# Patient Record
Sex: FEMALE | Race: WHITE | Employment: FULL TIME | URBAN - METROPOLITAN AREA
[De-identification: names, ages, dates, MRNs, and addresses within clinical notes are randomized per-mention and may not be internally consistent; named-entity substitution may affect disease eponyms.]

---

## 2021-01-30 ENCOUNTER — HOSPITAL ENCOUNTER (OUTPATIENT)
Dept: LAB | Age: 31
Discharge: HOME | End: 2021-01-30

## 2021-01-30 LAB — XX-LABCORP SPECIMEN COL,LCBCF: NORMAL

## 2021-01-30 PROCEDURE — 99001 SPECIMEN HANDLING PT-LAB: CPT

## 2021-02-11 ENCOUNTER — HOSPITAL ENCOUNTER (OUTPATIENT)
Dept: PREADMISSION TESTING | Age: 31
Discharge: HOME | End: 2021-02-11
Payer: MEDICAID

## 2021-02-11 VITALS — BODY MASS INDEX: 34.73 KG/M2 | WEIGHT: 196 LBS | HEIGHT: 63 IN

## 2021-02-11 PROCEDURE — U0003 INFECTIOUS AGENT DETECTION BY NUCLEIC ACID (DNA OR RNA); SEVERE ACUTE RESPIRATORY SYNDROME CORONAVIRUS 2 (SARS-COV-2) (CORONAVIRUS DISEASE [COVID-19]), AMPLIFIED PROBE TECHNIQUE, MAKING USE OF HIGH THROUGHPUT TECHNOLOGIES AS DESCRIBED BY CMS-2020-01-R: HCPCS

## 2021-02-11 RX ORDER — ALBUTEROL SULFATE 90 UG/1
2 AEROSOL, METERED RESPIRATORY (INHALATION)
COMMUNITY
Start: 2020-06-01

## 2021-02-11 RX ORDER — VITAMIN A ACETATE, BETA CAROTENE, ASCORBIC ACID, CHOLECALCIFEROL, .ALPHA.-TOCOPHEROL ACETATE, DL-, THIAMINE MONONITRATE, RIBOFLAVIN, NIACINAMIDE, PYRIDOXINE HYDROCHLORIDE, FOLIC ACID, CYANOCOBALAMIN, CALCIUM CARBONATE, FERROUS FUMARATE, ZINC OXIDE, CUPRIC OXIDE 3080; 12; 120; 400; 1; 1.84; 3; 20; 22; 920; 25; 200; 27; 10; 2 [IU]/1; UG/1; MG/1; [IU]/1; MG/1; MG/1; MG/1; MG/1; MG/1; [IU]/1; MG/1; MG/1; MG/1; MG/1; MG/1
1 TABLET, FILM COATED ORAL DAILY
COMMUNITY

## 2021-02-11 RX ORDER — SODIUM CHLORIDE, SODIUM LACTATE, POTASSIUM CHLORIDE, CALCIUM CHLORIDE 600; 310; 30; 20 MG/100ML; MG/100ML; MG/100ML; MG/100ML
125 INJECTION, SOLUTION INTRAVENOUS CONTINUOUS
Status: CANCELLED | OUTPATIENT
Start: 2021-02-11

## 2021-02-11 NOTE — PERIOP NOTES
Leave all valuables at home to include wallets/purse, money/credit cards, electronics to include cell phone, laptop, and tablets. Please arrange for your transportation home Denies any prosthetics. Patient states that the family physician is not aware of upcoming procedure. Do not put any lotion, jewelry, makeup, fingernail or toenail polish; no wigs, no private piercings; no tictac,gum and mouthwash. Denies sleep apnea. Denies family history of anesthesia complications. Please be aware that due to unforeseen circumstances, delays may occur and your patience will be appreciated. Denies shortness of breath nor chest pain while climbing stairs. Pt aware: to come in for covid testing  prior to dos; to self quarantine (includes no visitors) after covid test done.  covid 2-11-21

## 2021-02-12 ENCOUNTER — HOSPITAL ENCOUNTER (OUTPATIENT)
Dept: LAB | Age: 31
Discharge: HOME | End: 2021-02-12
Payer: MEDICAID

## 2021-02-12 ENCOUNTER — ANESTHESIA EVENT (OUTPATIENT)
Dept: SURGERY | Age: 31
End: 2021-02-12
Payer: MEDICAID

## 2021-02-12 LAB
BASOPHILS # BLD: 0.1 K/UL (ref 0–0.1)
BASOPHILS NFR BLD: 0 % (ref 0–2)
DIFFERENTIAL METHOD BLD: ABNORMAL
EOSINOPHIL # BLD: 0.5 K/UL (ref 0–0.4)
EOSINOPHIL NFR BLD: 4 % (ref 0–5)
ERYTHROCYTE [DISTWIDTH] IN BLOOD BY AUTOMATED COUNT: 12.2 % (ref 11.6–14.5)
HCT VFR BLD AUTO: 41.5 % (ref 35–45)
HGB BLD-MCNC: 14.3 G/DL (ref 12–16)
LYMPHOCYTES # BLD: 3.1 K/UL (ref 0.9–3.6)
LYMPHOCYTES NFR BLD: 28 % (ref 21–52)
MCH RBC QN AUTO: 31.4 PG (ref 24–34)
MCHC RBC AUTO-ENTMCNC: 34.5 G/DL (ref 31–37)
MCV RBC AUTO: 91.2 FL (ref 74–97)
MONOCYTES # BLD: 0.8 K/UL (ref 0.05–1.2)
MONOCYTES NFR BLD: 7 % (ref 3–10)
NEUTS SEG # BLD: 6.9 K/UL (ref 1.8–8)
NEUTS SEG NFR BLD: 61 % (ref 40–73)
PLATELET # BLD AUTO: 246 K/UL (ref 135–420)
PMV BLD AUTO: 9.5 FL (ref 9.2–11.8)
RBC # BLD AUTO: 4.55 M/UL (ref 4.2–5.3)
SARS-COV-2, COV2NT: NOT DETECTED
WBC # BLD AUTO: 11.3 K/UL (ref 4.6–13.2)

## 2021-02-12 PROCEDURE — 36415 COLL VENOUS BLD VENIPUNCTURE: CPT

## 2021-02-12 PROCEDURE — 85025 COMPLETE CBC W/AUTO DIFF WBC: CPT

## 2021-02-15 ENCOUNTER — HOSPITAL ENCOUNTER (OUTPATIENT)
Age: 31
Setting detail: OUTPATIENT SURGERY
Discharge: HOME | End: 2021-02-15
Attending: OBSTETRICS & GYNECOLOGY | Admitting: OBSTETRICS & GYNECOLOGY
Payer: MEDICAID

## 2021-02-15 ENCOUNTER — ANESTHESIA (OUTPATIENT)
Dept: SURGERY | Age: 31
End: 2021-02-15
Payer: MEDICAID

## 2021-02-15 VITALS
TEMPERATURE: 97.1 F | BODY MASS INDEX: 36.14 KG/M2 | HEIGHT: 63 IN | WEIGHT: 204 LBS | DIASTOLIC BLOOD PRESSURE: 54 MMHG | SYSTOLIC BLOOD PRESSURE: 115 MMHG | HEART RATE: 72 BPM | RESPIRATION RATE: 16 BRPM | OXYGEN SATURATION: 99 %

## 2021-02-15 DIAGNOSIS — G89.18 POSTOPERATIVE PAIN: Primary | ICD-10-CM

## 2021-02-15 PROBLEM — O02.1 MISSED ABORTION: Chronic | Status: ACTIVE | Noted: 2021-02-15

## 2021-02-15 PROBLEM — O02.1 MISSED ABORTION: Chronic | Status: RESOLVED | Noted: 2021-02-15 | Resolved: 2021-02-15

## 2021-02-15 LAB
ABO + RH BLD: NORMAL
BLOOD GROUP ANTIBODIES SERPL: NORMAL
SPECIMEN EXP DATE BLD: NORMAL

## 2021-02-15 PROCEDURE — 36415 COLL VENOUS BLD VENIPUNCTURE: CPT

## 2021-02-15 PROCEDURE — 77030020782 HC GWN BAIR PAWS FLX 3M -B: Performed by: OBSTETRICS & GYNECOLOGY

## 2021-02-15 PROCEDURE — 74011250636 HC RX REV CODE- 250/636: Performed by: OBSTETRICS & GYNECOLOGY

## 2021-02-15 PROCEDURE — 76210000016 HC OR PH I REC 1 TO 1.5 HR: Performed by: OBSTETRICS & GYNECOLOGY

## 2021-02-15 PROCEDURE — 2709999900 HC NON-CHARGEABLE SUPPLY: Performed by: OBSTETRICS & GYNECOLOGY

## 2021-02-15 PROCEDURE — 76210000021 HC REC RM PH II 0.5 TO 1 HR: Performed by: OBSTETRICS & GYNECOLOGY

## 2021-02-15 PROCEDURE — 77030040361 HC SLV COMPR DVT MDII -B: Performed by: OBSTETRICS & GYNECOLOGY

## 2021-02-15 PROCEDURE — 77030008578 HC TBNG UTER SUC BUSS -A: Performed by: OBSTETRICS & GYNECOLOGY

## 2021-02-15 PROCEDURE — 77030011210: Performed by: OBSTETRICS & GYNECOLOGY

## 2021-02-15 PROCEDURE — 74011250636 HC RX REV CODE- 250/636: Performed by: ANESTHESIOLOGY

## 2021-02-15 PROCEDURE — 88305 TISSUE EXAM BY PATHOLOGIST: CPT

## 2021-02-15 PROCEDURE — 74011000250 HC RX REV CODE- 250: Performed by: ANESTHESIOLOGY

## 2021-02-15 PROCEDURE — 86901 BLOOD TYPING SEROLOGIC RH(D): CPT

## 2021-02-15 PROCEDURE — 76010000154 HC OR TIME FIRST 0.5 HR: Performed by: OBSTETRICS & GYNECOLOGY

## 2021-02-15 PROCEDURE — 76060000031 HC ANESTHESIA FIRST 0.5 HR: Performed by: OBSTETRICS & GYNECOLOGY

## 2021-02-15 PROCEDURE — 77030012508 HC MSK AIRWY LMA AMBU -A: Performed by: ANESTHESIOLOGY

## 2021-02-15 RX ORDER — ONDANSETRON 2 MG/ML
INJECTION INTRAMUSCULAR; INTRAVENOUS AS NEEDED
Status: DISCONTINUED | OUTPATIENT
Start: 2021-02-15 | End: 2021-02-15 | Stop reason: HOSPADM

## 2021-02-15 RX ORDER — ONDANSETRON 2 MG/ML
4 INJECTION INTRAMUSCULAR; INTRAVENOUS ONCE
Status: DISCONTINUED | OUTPATIENT
Start: 2021-02-15 | End: 2021-02-15 | Stop reason: HOSPADM

## 2021-02-15 RX ORDER — LIDOCAINE HYDROCHLORIDE 20 MG/ML
INJECTION, SOLUTION EPIDURAL; INFILTRATION; INTRACAUDAL; PERINEURAL AS NEEDED
Status: DISCONTINUED | OUTPATIENT
Start: 2021-02-15 | End: 2021-02-15 | Stop reason: HOSPADM

## 2021-02-15 RX ORDER — MAGNESIUM SULFATE 100 %
4 CRYSTALS MISCELLANEOUS AS NEEDED
Status: DISCONTINUED | OUTPATIENT
Start: 2021-02-15 | End: 2021-02-15 | Stop reason: HOSPADM

## 2021-02-15 RX ORDER — SODIUM CHLORIDE 0.9 % (FLUSH) 0.9 %
5-40 SYRINGE (ML) INJECTION AS NEEDED
Status: DISCONTINUED | OUTPATIENT
Start: 2021-02-15 | End: 2021-02-15 | Stop reason: HOSPADM

## 2021-02-15 RX ORDER — INSULIN LISPRO 100 [IU]/ML
INJECTION, SOLUTION INTRAVENOUS; SUBCUTANEOUS ONCE
Status: DISCONTINUED | OUTPATIENT
Start: 2021-02-15 | End: 2021-02-15 | Stop reason: HOSPADM

## 2021-02-15 RX ORDER — FENTANYL CITRATE 50 UG/ML
25 INJECTION, SOLUTION INTRAMUSCULAR; INTRAVENOUS
Status: DISCONTINUED | OUTPATIENT
Start: 2021-02-15 | End: 2021-02-15 | Stop reason: HOSPADM

## 2021-02-15 RX ORDER — IBUPROFEN 800 MG/1
800 TABLET ORAL
Qty: 30 TAB | Refills: 0 | Status: SHIPPED | OUTPATIENT
Start: 2021-02-15

## 2021-02-15 RX ORDER — KETOROLAC TROMETHAMINE 15 MG/ML
INJECTION, SOLUTION INTRAMUSCULAR; INTRAVENOUS AS NEEDED
Status: DISCONTINUED | OUTPATIENT
Start: 2021-02-15 | End: 2021-02-15 | Stop reason: HOSPADM

## 2021-02-15 RX ORDER — DEXTROSE MONOHYDRATE 100 MG/ML
125-250 INJECTION, SOLUTION INTRAVENOUS AS NEEDED
Status: DISCONTINUED | OUTPATIENT
Start: 2021-02-15 | End: 2021-02-15 | Stop reason: HOSPADM

## 2021-02-15 RX ORDER — HYDROCODONE BITARTRATE AND ACETAMINOPHEN 5; 325 MG/1; MG/1
1 TABLET ORAL AS NEEDED
Status: DISCONTINUED | OUTPATIENT
Start: 2021-02-15 | End: 2021-02-15 | Stop reason: HOSPADM

## 2021-02-15 RX ORDER — SODIUM CHLORIDE, SODIUM LACTATE, POTASSIUM CHLORIDE, CALCIUM CHLORIDE 600; 310; 30; 20 MG/100ML; MG/100ML; MG/100ML; MG/100ML
125 INJECTION, SOLUTION INTRAVENOUS CONTINUOUS
Status: DISCONTINUED | OUTPATIENT
Start: 2021-02-15 | End: 2021-02-15 | Stop reason: HOSPADM

## 2021-02-15 RX ORDER — OXYCODONE AND ACETAMINOPHEN 5; 325 MG/1; MG/1
1 TABLET ORAL
Qty: 20 TAB | Refills: 0 | Status: SHIPPED | OUTPATIENT
Start: 2021-02-15 | End: 2021-02-20

## 2021-02-15 RX ORDER — DIPHENHYDRAMINE HYDROCHLORIDE 50 MG/ML
12.5 INJECTION, SOLUTION INTRAMUSCULAR; INTRAVENOUS
Status: DISCONTINUED | OUTPATIENT
Start: 2021-02-15 | End: 2021-02-15 | Stop reason: HOSPADM

## 2021-02-15 RX ORDER — SODIUM CHLORIDE 0.9 % (FLUSH) 0.9 %
5-40 SYRINGE (ML) INJECTION EVERY 8 HOURS
Status: DISCONTINUED | OUTPATIENT
Start: 2021-02-15 | End: 2021-02-15 | Stop reason: HOSPADM

## 2021-02-15 RX ORDER — ALBUTEROL SULFATE 0.83 MG/ML
2.5 SOLUTION RESPIRATORY (INHALATION) AS NEEDED
Status: DISCONTINUED | OUTPATIENT
Start: 2021-02-15 | End: 2021-02-15 | Stop reason: HOSPADM

## 2021-02-15 RX ORDER — SODIUM CHLORIDE, SODIUM LACTATE, POTASSIUM CHLORIDE, CALCIUM CHLORIDE 600; 310; 30; 20 MG/100ML; MG/100ML; MG/100ML; MG/100ML
150 INJECTION, SOLUTION INTRAVENOUS CONTINUOUS
Status: DISCONTINUED | OUTPATIENT
Start: 2021-02-15 | End: 2021-02-15 | Stop reason: HOSPADM

## 2021-02-15 RX ORDER — NALOXONE HYDROCHLORIDE 0.4 MG/ML
0.1 INJECTION, SOLUTION INTRAMUSCULAR; INTRAVENOUS; SUBCUTANEOUS AS NEEDED
Status: DISCONTINUED | OUTPATIENT
Start: 2021-02-15 | End: 2021-02-15 | Stop reason: HOSPADM

## 2021-02-15 RX ORDER — DEXAMETHASONE SODIUM PHOSPHATE 4 MG/ML
INJECTION, SOLUTION INTRA-ARTICULAR; INTRALESIONAL; INTRAMUSCULAR; INTRAVENOUS; SOFT TISSUE AS NEEDED
Status: DISCONTINUED | OUTPATIENT
Start: 2021-02-15 | End: 2021-02-15 | Stop reason: HOSPADM

## 2021-02-15 RX ORDER — PROPOFOL 10 MG/ML
INJECTION, EMULSION INTRAVENOUS AS NEEDED
Status: DISCONTINUED | OUTPATIENT
Start: 2021-02-15 | End: 2021-02-15 | Stop reason: HOSPADM

## 2021-02-15 RX ORDER — FENTANYL CITRATE 50 UG/ML
INJECTION, SOLUTION INTRAMUSCULAR; INTRAVENOUS AS NEEDED
Status: DISCONTINUED | OUTPATIENT
Start: 2021-02-15 | End: 2021-02-15 | Stop reason: HOSPADM

## 2021-02-15 RX ORDER — HYDROMORPHONE HYDROCHLORIDE 1 MG/ML
0.2 INJECTION, SOLUTION INTRAMUSCULAR; INTRAVENOUS; SUBCUTANEOUS AS NEEDED
Status: DISCONTINUED | OUTPATIENT
Start: 2021-02-15 | End: 2021-02-15 | Stop reason: HOSPADM

## 2021-02-15 RX ADMIN — FENTANYL CITRATE 50 MCG: 50 INJECTION, SOLUTION INTRAMUSCULAR; INTRAVENOUS at 10:38

## 2021-02-15 RX ADMIN — ONDANSETRON HYDROCHLORIDE 4 MG: 2 INJECTION INTRAMUSCULAR; INTRAVENOUS at 10:50

## 2021-02-15 RX ADMIN — FENTANYL CITRATE 25 MCG: 50 INJECTION INTRAMUSCULAR; INTRAVENOUS at 11:30

## 2021-02-15 RX ADMIN — SODIUM CHLORIDE, SODIUM LACTATE, POTASSIUM CHLORIDE, AND CALCIUM CHLORIDE: 600; 310; 30; 20 INJECTION, SOLUTION INTRAVENOUS at 10:32

## 2021-02-15 RX ADMIN — FENTANYL CITRATE 25 MCG: 50 INJECTION INTRAMUSCULAR; INTRAVENOUS at 11:16

## 2021-02-15 RX ADMIN — PROPOFOL 150 MG: 10 INJECTION, EMULSION INTRAVENOUS at 10:35

## 2021-02-15 RX ADMIN — SODIUM CHLORIDE, SODIUM LACTATE, POTASSIUM CHLORIDE, AND CALCIUM CHLORIDE 150 ML/HR: 600; 310; 30; 20 INJECTION, SOLUTION INTRAVENOUS at 11:15

## 2021-02-15 RX ADMIN — KETOROLAC TROMETHAMINE 30 MG: 15 INJECTION, SOLUTION INTRAMUSCULAR; INTRAVENOUS at 10:50

## 2021-02-15 RX ADMIN — FENTANYL CITRATE 50 MCG: 50 INJECTION, SOLUTION INTRAMUSCULAR; INTRAVENOUS at 10:34

## 2021-02-15 RX ADMIN — DEXAMETHASONE SODIUM PHOSPHATE 4 MG: 4 INJECTION, SOLUTION INTRAMUSCULAR; INTRAVENOUS at 10:39

## 2021-02-15 RX ADMIN — SODIUM CHLORIDE, SODIUM LACTATE, POTASSIUM CHLORIDE, AND CALCIUM CHLORIDE 125 ML/HR: 600; 310; 30; 20 INJECTION, SOLUTION INTRAVENOUS at 09:14

## 2021-02-15 RX ADMIN — LIDOCAINE HYDROCHLORIDE 60 MG: 20 INJECTION, SOLUTION EPIDURAL; INFILTRATION; INTRACAUDAL; PERINEURAL at 10:34

## 2021-02-15 NOTE — ANESTHESIA PREPROCEDURE EVALUATION
Relevant Problems   No relevant active problems       Anesthetic History   No history of anesthetic complications            Review of Systems / Medical History  Patient summary reviewed, nursing notes reviewed and pertinent labs reviewed    Pulmonary  Within defined limits                 Neuro/Psych         Psychiatric history     Cardiovascular                  Exercise tolerance: >4 METS     GI/Hepatic/Renal  Within defined limits              Endo/Other  Within defined limits           Other Findings              Physical Exam    Airway  Mallampati: II  TM Distance: 4 - 6 cm  Neck ROM: normal range of motion   Mouth opening: Normal     Cardiovascular  Regular rate and rhythm,  S1 and S2 normal,  no murmur, click, rub, or gallop             Dental  No notable dental hx       Pulmonary  Breath sounds clear to auscultation               Abdominal  GI exam deferred       Other Findings            Anesthetic Plan    ASA: 2  Anesthesia type: general          Induction: Intravenous  Anesthetic plan and risks discussed with: Patient

## 2021-02-15 NOTE — ROUTINE PROCESS
Reviewed PTA medication list with patient/caregiver and patient/caregiver denies any additional medications. Patient admits to having a responsible adult care for them at home for at least 24 hours after surgery. Patient encouraged to use gown warming system and informed that using said warming gown to regulate body temperature prior to a procedure has been shown to help reduce the risks of blood clots and infection. Patient's pharmacy of choice verified and documented in PTA medication section. Dual skin assessment & fall risk band verification completed with LUCRECIA Blancas.

## 2021-02-15 NOTE — INTERVAL H&P NOTE
Update History & Physical 
 
The Patient's History and Physical of February 11, 2021 was reviewed with the patient and I examined the patient. There was no change. The surgical site was confirmed by the patient and me. Plan:  The risk, benefits, expected outcome, and alternative to the recommended procedure have been discussed with the patient. Patient understands and wants to proceed with the procedure.  
 
Electronically signed by Cruz Frazier MD on 2/15/2021 at 10:27 AM

## 2021-02-15 NOTE — PERIOP NOTES
Discharge instructions completed via phone call to [employer] - opportunity for questions - AVS med list reviewed for duplicates

## 2021-02-15 NOTE — DISCHARGE INSTRUCTIONS
[facility], [address], [address] [facility], [address], [address], [place], [address] [facility]  Office: [phone]  Fax:    [phone]    PROCEDURE: Procedure(s):  DILATATION AND CURETTAGE WITH SUCTION    Notify [facility] IMMEDIATELY if any of the following occur:     You are unable to urinate. Urgency to urinate is not uncommon.  Excessive vaginal bleeding > 1 maxi pad an hour for more then 2 hours straight.  Temperature above 101.0° and / or chills.  You are nauseous and / or vomiting and you cannot hold down any fluids.  Your pain is not controlled with the pain medication prescribed. Special Considerations:      Do not drive for at least 24 hours after the procedure and until you are no longer taking narcotic pain medication and you are able to move and react without hesitation.  You may return to work the following day. [x] Pelvic rest (nothing in the vagina) for 3 weeks. [] No heavy lifting over 10 pounds & no strenuous exercise for 6 weeks. [] Other instructions:         MEDICATIONS: PROVIDED AT DISCHARGE OR PREVIOUSLY PRESCRIBED AT PRE OPERATIVE APPOINTMENT WITH [facility] --  Narcotic Pain Med.   []  Vicodin®   [x]  Percocet®   []  Dilaudid®    Non-narcotic Pain Med. [x]   Ibuprofen        Antibiotics   []  Cipro®   []  Keflex®     []  Bactrim DS®       Urgency   []   Vesicare®       Nausea      []    Zofran®     []    Phenergan®       Other   []    Colace          If you have not already scheduled your post operative appointment please do so with our office staff. Your post partum appointment should be in 3 weeks. Please contact [place] [address] [facility] at [phone] or go to the nearest Emergency Department / Urgent Care facility for any other medical questions or concerns.                DISCHARGE SUMMARY from Nurse    PATIENT INSTRUCTIONS:    After general anesthesia or intravenous sedation, for 24 hours or while taking prescription Narcotics:  · Limit your activities  · Do not drive and operate hazardous machinery  · Do not make important personal or business decisions  · Do  not drink alcoholic beverages  · If you have not urinated within 8 hours after discharge, please contact your surgeon on call. Report the following to your surgeon:  · Excessive pain, swelling, redness or odor of or around the surgical area  · Temperature over 100.5  · Nausea and vomiting lasting longer than 4 hours or if unable to take medications  · Any signs of decreased circulation or nerve impairment to extremity: change in color, persistent  numbness, tingling, coldness or increase pain  · Any questions    What to do at Home:  Recommended activity: Ambulate in house, No sex until advised  and No driving while on analgesics,     If you experience any of the following symptoms above, please follow up with Dr. Kayla Rose. *  Please give a list of your current medications to your Primary Care Provider. *  Please update this list whenever your medications are discontinued, doses are      changed, or new medications (including over-the-counter products) are added. *  Please carry medication information at all times in case of emergency situations. These are general instructions for a healthy lifestyle:    No smoking/ No tobacco products/ Avoid exposure to second hand smoke  Surgeon General's Warning:  Quitting smoking now greatly reduces serious risk to your health.     Obesity, smoking, and sedentary lifestyle greatly increases your risk for illness    A healthy diet, regular physical exercise & weight monitoring are important for maintaining a healthy lifestyle    You may be retaining fluid if you have a history of heart failure or if you experience any of the following symptoms:  Weight gain of 3 pounds or more overnight or 5 pounds in a week, increased swelling in our hands or feet or shortness of breath while lying flat in bed.  Please call your doctor as soon as you notice any of these symptoms; do not wait until your next office visit. Patient armband removed and shredded    The discharge information has been reviewed with the patient and caregiver. The patient and caregiver verbalized understanding. Discharge medications reviewed with the patient and caregiver and appropriate educational materials and side effects teaching were provided. Learning About Coronavirus [phone])  Coronavirus [phone]): Overview  What is coronavirus (COVID-19)? The coronavirus disease (COVID-19) is caused by a virus. It is an illness that was first found in [place], [place], in December 2019. It has since spread worldwide. The virus can cause fever, cough, and trouble breathing. In severe cases, it can cause pneumonia and make it hard to breathe without help. It can cause death. Coronaviruses are a large group of viruses. They cause the common cold. They also cause more serious illnesses like Middle East respiratory syndrome (MERS) and severe acute respiratory syndrome (SARS). COVID-19 is caused by a novel coronavirus. That means it's a new type that has not been seen in people before. This virus spreads person-to-person through droplets from coughing and sneezing. It can also spread when you are close to someone who is infected. And it can spread when you touch something that has the virus on it, such as a doorknob or a tabletop. What can you do to protect yourself from coronavirus (COVID-19)? The best way to protect yourself from getting sick is to:  · Avoid areas where there is an outbreak. · Avoid contact with people who may be infected. · Wash your hands often with soap or alcohol-based hand sanitizers. · Avoid crowds and try to stay at least 6 feet away from other people. · Wash your hands often, especially after you cough or sneeze. Use soap and water, and scrub for at least 20 seconds.  If soap and water aren't available, use an alcohol-based hand . · Avoid touching your mouth, nose, and eyes. What can you do to avoid spreading the virus to others? To help avoid spreading the virus to others:  · Cover your mouth with a tissue when you cough or sneeze. Then throw the tissue in the trash. · Use a disinfectant to clean things that you touch often. · Stay home if you are sick or have been exposed to the virus. Don't go to school, work, or public areas. And don't use public transportation. · If you are sick:  ? Leave your home only if you need to get medical care. But call the doctor's office first so they know you're coming. And wear a face mask, if you have one.  ? If you have a face mask, wear it whenever you're around other people. It can help stop the spread of the virus when you cough or sneeze. ? Clean and disinfect your home every day. Use household  and disinfectant wipes or sprays. Take special care to clean things that you grab with your hands. These include doorknobs, remote controls, phones, and handles on your refrigerator and microwave. And don't forget countertops, tabletops, bathrooms, and computer keyboards. When to call for help  Call 911 anytime you think you may need emergency care. For example, call if:  · You have severe trouble breathing. (You can't talk at all.)  · You have constant chest pain or pressure. · You are severely dizzy or lightheaded. · You are confused or can't think clearly. · Your face and lips have a blue color. · You pass out (lose consciousness) or are very hard to wake up. Call your doctor now if you develop symptoms such as:  · Shortness of breath. · Fever. · Cough. If you need to get care, call ahead to the doctor's office for instructions before you go. Make sure you wear a face mask, if you have one, to prevent exposing other people to the virus. Where can you get the latest information? The following health organizations are tracking and studying this virus. Their websites contain the most up-to-date information. [place] also learn what to do if you think you may have been exposed to the virus. · U.S. Centers for Disease Control and Prevention (CDC): The CDC provides updated news about the disease and travel advice. The website also tells you how to prevent the spread of infection. www.cdc.gov  · World Health Organization [facility]): WHO offers information about the virus outbreaks. WHO also has travel advice. www.who.int  Current as of: April 1, 2020               Content Version: 12.4  © 2006-2020 [employer]. Care instructions adapted under license by your healthcare professional. If you have questions about a medical condition or this instruction, always ask your healthcare professional. [address] any warranty or liability for your use of this information.     ___________________________________________________________________________________________________________________________________

## 2021-02-15 NOTE — PROGRESS NOTES
conducted an initial consultation, Spiritual Assessment and  Loss pastoral care encounter for Erik Fritz, who is a 26 y.o. According to the patients EMR Rastafari Affiliation is: Sylvia Torres. Name for baby:     Location of pastoral care encounter was: [x] Day Surgery [] ED []  L&D [] 2A  [] Other:    Type of Loss:  [x] Miscarriage(<19 Weeks)     [] Stillborn (>20 Weeks)     [] Live Birth resulting in Death       [] Ectopic     [] Blighted Ovum     [] Molar     []      [] SIDS    Estimated Gestastional Age was       weeks. Patient is open to more information about Share Services:  [] Yes  [x]  No  Email Address: Mother's Address and Phone Number are:  Zeinab Sanz  [unfilled]    Affiliated with a Firm[employer]   [x] Yes  [] No (If Yes, include name below). The  provided the following Interventions:  Initiated a relationship of care and support. Explored issues of sheila, belief, spirituality and Judaism/ritual needs while hospitalized. Listened empathically. Provided information about Spiritual Care Services, Grief and  Loss Support.  provided assurance of continued prayers on patients behalf. Chart reviewed. Discussed grief issues.   Memorabilia was given to mother [] Yes  [x] No    Sister Luiz Black[place], [address]  [phone]

## 2021-02-15 NOTE — OP NOTES
[facility]  OPERATIVE REPORT    Name:  Aba Neely  MR#:   [id number]  :  1990  ACCOUNT #:  [de-identified]  DATE OF SERVICE:  02/15/2021    PREOPERATIVE DIAGNOSIS:  Missed . POSTOPERATIVE DIAGNOSIS:  Missed . PROCEDURE PERFORMED:  Suction, dilation, and curettage. SURGEON:  Jennifer Rucker MD    ASSISTANT:  Mary Sebastian. ANESTHESIOLOGIST:  Sammy Bejarano MD    ANESTHESIA:  General.    COMPLICATIONS:  None. SPECIMENS REMOVED:  Appropriate amounts of products of conception. IMPLANTS:  None. ESTIMATED BLOOD LOSS:  Minimal.    IV FLUIDS:  2000 mL of lactated Ringers. URINE OUTPUT:  Not recorded. INDICATIONS:  This is a multiparous patient with a missed  diagnosed on ultrasound on , findings were reviewed with the patient. Risks, benefits, and alternatives were discussed, and she opted for surgical management as stated above. All questions were answered prior to surgical start time. PROCEDURE:  The patient was taken to the OR where general anesthesia was obtained. She was prepared and draped in the usual sterile fashion in dorsal lithotomy position with legs in stirrups. A weighted speculum was placed in the vagina, and a Manzanares retractor in the anterior fornix to expose the cervix. The anterior lip of the cervix was grasped with a single-tooth tenaculum. Cervical os was gradually dilated to allow introduction of an 8-mm curved curette. This was advanced to the fundus and activated. Several passes were made followed by gentle curettage until a gritty texture was noted throughout. Specimen was handed off. Instruments removed. Excellent hemostasis assured at the end of the case. The patient tolerated the procedure well. Sponge, lap, needle, and instrument counts were correct x2. She was taken to Recovery in stable condition.       Igor Merino MD      [id number]  D:  02/15/2021 10:52  T:  02/15/2021 18:16  JOB #: [id number]

## 2021-02-15 NOTE — DISCHARGE SUMMARY
Discharge Summary     Name: Shala Griffin MRN: [id number]  SSN: [id number]    YOB: 1990  Age: 26 y.o. Sex: female      Allergies: Patient has no known allergies. Admit Date: 2/15/2021    Discharge Date: 2/15/2021      Admitting Physician: Cruz Frazier MD     * Admission Diagnoses: Missed     * Discharge Diagnoses:   Hospital Problems as of 2/15/2021 Date Reviewed: 2/15/2021          Codes Class Noted - Resolved POA    * (Principal) RESOLVED: Missed  (Chronic) ICD-10-CM: O02.1  ICD-9-CM: 632  2/15/2021 - 2/15/2021 Yes               * Procedures: Suction D&C    * Discharge Condition: [facility] Course: Normal hospital course for this procedure. Significant Diagnostic Studies: No results found for this or any previous visit (from the past 24 hour(s)). * Disposition: Home    Discharge Medications:   Current Discharge Medication List      START taking these medications    Details   oxyCODONE-acetaminophen (Percocet) 5-325 mg per tablet Take 1 Tab by mouth every four (4) hours as needed for Pain for up to 5 days. Max Daily Amount: 6 Tabs. Indications: pain  Qty: 20 Tab, Refills: 0    Associated Diagnoses: Postoperative pain      ibuprofen (MOTRIN) 800 mg tablet Take 1 Tab by mouth every eight (8) hours as needed for Pain. Qty: 30 Tab, Refills: 0    Associated Diagnoses: Postoperative pain         CONTINUE these medications which have NOT CHANGED    Details   albuterol (PROVENTIL HFA, VENTOLIN HFA, PROAIR HFA) 90 mcg/actuation inhaler Take 2 Puffs by inhalation. prenatal vit-calcium-iron-fa (Prenatal Plus, calcium carb,) 27 mg iron- 1 mg tab Take 1 Tab by mouth daily. * Follow-up Care/Patient Instructions: Activity: No sex, douching, or tampons for 3 weeks or as directed by your physician. No driving while taking pain medication. Diet: Resume pre-hospital diet  Wound Care: Pelvic rest x 3 weeks.

## 2021-02-17 NOTE — ANESTHESIA POSTPROCEDURE EVALUATION
Procedure(s):  DILATATION AND CURETTAGE WITH SUCTION.     general    Anesthesia Post Evaluation      Multimodal analgesia: multimodal analgesia not used between 6 hours prior to anesthesia start to PACU discharge  Patient location during evaluation: bedside  Patient participation: complete - patient participated  Pain management: adequate  Airway patency: patent  Anesthetic complications: no  Cardiovascular status: acceptable  Respiratory status: acceptable  Hydration status: acceptable  Post anesthesia nausea and vomiting:  none  Final Post Anesthesia Temperature Assessment:  Normothermia (36.0-37.5 degrees C)      INITIAL Post-op Vital signs:   Vitals Value Taken Time   /67 02/15/21 1150   Temp 37.2 °C (99 °F) 02/15/21 1054   Pulse 59 02/15/21 1150   Resp 12 02/15/21 1150   SpO2 94 % 02/15/21 1150

## (undated) DEVICE — D&C HYSTEROSCOPY: Brand: MEDLINE INDUSTRIES, INC.

## (undated) DEVICE — TUBE SUC UTER ASPIR 3/8INX6FT --

## (undated) DEVICE — STERILE POLYISOPRENE POWDER-FREE SURGICAL GLOVES: Brand: PROTEXIS

## (undated) DEVICE — LUB SURG MEDC STRL 2OZ TUBE MC -- MEDICHOICE

## (undated) DEVICE — GARMENT,MEDLINE,DVT,INT,CALF,MED, GEN2: Brand: MEDLINE

## (undated) DEVICE — CURETTE VAC DIA8MM PLAS CRV OPN TIP RIG DISP VACURET D/E

## (undated) DEVICE — SOL IRRIGATION INJ NACL 0.9% 500ML BTL

## (undated) DEVICE — COLLECTION KT SUC TISS BERK -- GYRUS

## (undated) DEVICE — GOWN,AURORA,NONRNF,XL,30/CS: Brand: MEDLINE